# Patient Record
Sex: MALE
[De-identification: names, ages, dates, MRNs, and addresses within clinical notes are randomized per-mention and may not be internally consistent; named-entity substitution may affect disease eponyms.]

---

## 2019-01-07 ENCOUNTER — RECORDS - HEALTHEAST (OUTPATIENT)
Dept: ADMINISTRATIVE | Facility: OTHER | Age: 24
End: 2019-01-07

## 2019-03-29 ENCOUNTER — RECORDS - HEALTHEAST (OUTPATIENT)
Dept: ADMINISTRATIVE | Facility: OTHER | Age: 24
End: 2019-03-29

## 2020-05-18 ENCOUNTER — AMBULATORY - HEALTHEAST (OUTPATIENT)
Dept: PULMONOLOGY | Facility: OTHER | Age: 25
End: 2020-05-18

## 2020-05-20 ENCOUNTER — OFFICE VISIT - HEALTHEAST (OUTPATIENT)
Dept: PULMONOLOGY | Facility: OTHER | Age: 25
End: 2020-05-20

## 2020-05-20 DIAGNOSIS — R07.1 CHEST PAIN ON BREATHING: ICD-10-CM

## 2020-05-20 RX ORDER — CETIRIZINE HYDROCHLORIDE 10 MG/1
10 TABLET ORAL DAILY
Status: SHIPPED | COMMUNITY
Start: 2020-05-20

## 2020-05-20 RX ORDER — ALBUTEROL SULFATE 90 UG/1
1-2 AEROSOL, METERED RESPIRATORY (INHALATION) EVERY 4 HOURS PRN
Qty: 1 INHALER | Refills: 5 | Status: SHIPPED | OUTPATIENT
Start: 2020-05-20

## 2020-05-20 RX ORDER — FAMOTIDINE 20 MG/1
20 TABLET, FILM COATED ORAL 2 TIMES DAILY
Status: SHIPPED | COMMUNITY
Start: 2020-05-20

## 2020-05-20 ASSESSMENT — MIFFLIN-ST. JEOR: SCORE: 1742.89

## 2020-05-27 ENCOUNTER — HOSPITAL ENCOUNTER (OUTPATIENT)
Dept: CT IMAGING | Facility: CLINIC | Age: 25
Discharge: HOME | End: 2020-05-27
Attending: INTERNAL MEDICINE

## 2020-05-27 DIAGNOSIS — R07.1 CHEST PAIN ON BREATHING: ICD-10-CM

## 2020-05-28 ENCOUNTER — COMMUNICATION - HEALTHEAST (OUTPATIENT)
Dept: PULMONOLOGY | Facility: OTHER | Age: 25
End: 2020-05-28

## 2021-06-04 VITALS — HEIGHT: 71 IN | WEIGHT: 160 LBS | BODY MASS INDEX: 22.4 KG/M2

## 2021-06-08 NOTE — TELEPHONE ENCOUNTER
Pulmonary Telephone Note    Chest CT with contrast showed no pulmonary, pleural, or chest wall disease. He had one episode of the right chest pain since our virtual visit. Albuterol HFA helped the symptom 1+ hours after using it. He feels reassured by the CT result. Will have him follow up with me as needed. Encouraged him to call any time with questions or concerning symptoms.    Marcell Damon MD  Pulmonary and Critical Care Medicine  [facility]  Cell [phone]  Office [phone]  Pager [phone]

## 2021-06-08 NOTE — PROGRESS NOTES
"Coleman Perez is a 24 y.o. male who is being evaluated via a billable telephone visit.      The patient has been notified of following:     \"This telephone visit will be conducted via a call between you and your physician/provider. We have found that certain health care needs can be provided without the need for a physical exam.  This service lets us provide the care you need with a short phone conversation.  If a prescription is necessary we can send it directly to your pharmacy.  If lab work is needed we can place an order for that and you can then stop by our lab to have the test done at a later time.    Telephone visits are billed at different rates depending on your insurance coverage. During this emergency period, for some insurers they may be billed the same as an in-person visit.  Please reach out to your insurance provider with any questions.    If during the course of the call the physician/provider feels a telephone visit is not appropriate, you will not be charged for this service.\"    Patient has given verbal consent to a Telephone visit? Yes    What phone number would you like to be contacted at? [phone]    Patient would like to receive their AVS by AVS Preference: Mail a copy.    Phone call duration: 23 minutes    Fern Mahmood CMA    Pulmonary Clinic Outpatient Consultation    Assessment and Plan:   24 year old male with a history of active cigar smoking, dyspepsia, GERD, and chronic atypical chest pain, presenting for evaluation of chest pain.    Chest pain: Nonspecific chronic intermittent inspiratory right-sided chest pain, triggered by inhalation of perfumes, soap and shampoo scents, and other \"man-made scents.\" Occurs predominantly in the right chest. Dull pain, occurs at different parts of the right chest. No history of falls or other trauma. Poorly motivated to quit smoking cigars; extensive education today on the number of toxic chemicals in cigar smoke. Had an episode of severe " "pneumonia several years ago; I do not believe he was hospitalized. DDx includes bronchospasm from adult-onset asthma, pleural of parenchymal lung disease. No fevers. No symptoms to suggest a specific connective tissue disease. No rashes, arthralgia, joint swelling. No dyspnea with hiking or other exercise. Has had brief urgent care and ED evaluations in [place], including clear CXR. Feels that the pain is categorically different from his episodes of GERD and dyspepsia, for which he takes famotidine. No prior pulmonary function testing or chest CT. Has not used an inhaler. His father and paternal grandfather have adult-onset asthma.    Plan:  - advised him to stop smoking cigars  - chest CT with contrast  - trial of albuterol HFA 1-2 inhalations as needed when the right chest pain occurs  - follow up in 4 weeks  - if no etiology on chest CT, consider change from H2 blocker to PPI and obtaining PFTs when pandemic restrictions are lifted  - advised him to establish care with a PCP  - encouraged him to call any time with questions or concerning symptoms    I appreciate the opportunity to participate in the care of Mr. Perez. Please feel free to contact me at any time.    Marcell Damon MD  [facility]  Cell [phone]  Office [phone]  Pager [phone]    CCx: chest pain    HPI: 24 year old male with a history of active cigar smoking, dyspepsia, GERD, and chronic atypical chest pain, presenting for evaluation of chest pain. Nonspecific chronic intermittent inspiratory right-sided chest pain, triggered by inhalation of perfumes, soap and shampoo scents, and other \"man-made scents,\" now going on for years. Occurs predominantly in the right chest. Dull pain, occurs at different parts of the right chest. No history of falls or other trauma. Poorly motivated to quit smoking cigars; extensive education today on the number of toxic chemicals in cigar smoke. Had an episode of severe pneumonia " several years ago; I do not believe he was hospitalized. DDx includes bronchospasm from adult-onset asthma, pleural of parenchymal lung disease. No fevers. No symptoms to suggest a specific connective tissue disease. No rashes, arthralgia, joint swelling. No dyspnea with hiking or other exercise. Has had brief urgent care and ED evaluations in [place], including clear CXR. Feels that the pain is categorically different from his episodes of GERD and dyspepsia, for which he takes famotidine. No prior pulmonary function testing or chest CT. Has not used an inhaler. His father and paternal grandfather have adult-onset asthma.    ROS:  A 12-system review was obtained and was negative with the exception of the symptoms endorsed in the history of present illness.    PMH:  GERD/dyspepsia  Cigar smoking    PSH:  No past surgical history on file.    Allergies:  No Known Allergies    Family HX:  No family history on file.    Social Hx:  Social History     Socioeconomic History     Marital status: Single     Spouse name: Not on file     Number of children: Not on file     Years of education: Not on file     Highest education level: Not on file   Occupational History     Not on file   Social Needs     Financial resource strain: Not on file     Food insecurity     Worry: Not on file     Inability: Not on file     Transportation needs     Medical: Not on file     Non-medical: Not on file   Tobacco Use     Smoking status: Current Every Day Smoker     Smokeless tobacco: Never Used   Substance and Sexual Activity     Alcohol use: Not on file     Drug use: Not on file     Sexual activity: Not on file   Lifestyle     Physical activity     Days per week: Not on file     Minutes per session: Not on file     Stress: Not on file   Relationships     Social connections     Talks on phone: Not on file     Gets together: Not on file     Attends [employer] service: Not on file     Active member of club or organization: Not on file     Attends  meetings of clubs or organizations: Not on file     Relationship status: Not on file     Intimate partner violence     Fear of current or ex partner: Not on file     Emotionally abused: Not on file     Physically abused: Not on file     Forced sexual activity: Not on file   Other Topics Concern     Not on file   Social History Narrative     Not on file       Current Meds:  Current Outpatient Medications   Medication Sig Dispense Refill     cetirizine (ZYRTEC) 10 MG tablet Take 10 mg by mouth daily.       famotidine (PEPCID) 20 MG tablet Take 20 mg by mouth 2 (two) times a day.       ibuprofen (ADVIL,MOTRIN) 600 MG tablet        No current facility-administered medications for this visit.        Physical Exam:  no exam due to virtual visit    Labs:  reviewed    Imaging studies:  CXR ([facility], January 2019):  - images not available to me  - report states that the CXR is clear